# Patient Record
Sex: FEMALE | Race: WHITE | Employment: UNEMPLOYED | URBAN - METROPOLITAN AREA
[De-identification: names, ages, dates, MRNs, and addresses within clinical notes are randomized per-mention and may not be internally consistent; named-entity substitution may affect disease eponyms.]

---

## 2019-08-06 ENCOUNTER — OFFICE VISIT (OUTPATIENT)
Dept: FAMILY MEDICINE CLINIC | Age: 29
End: 2019-08-06

## 2019-08-06 VITALS
TEMPERATURE: 98.1 F | HEIGHT: 69 IN | HEART RATE: 128 BPM | DIASTOLIC BLOOD PRESSURE: 85 MMHG | SYSTOLIC BLOOD PRESSURE: 120 MMHG | BODY MASS INDEX: 18.66 KG/M2 | WEIGHT: 126 LBS | RESPIRATION RATE: 12 BRPM | OXYGEN SATURATION: 99 %

## 2019-08-06 DIAGNOSIS — E10.9 CONTROLLED DIABETES MELLITUS TYPE 1 WITHOUT COMPLICATIONS (HCC): Primary | ICD-10-CM

## 2019-08-06 RX ORDER — INSULIN GLARGINE 100 [IU]/ML
16 INJECTION, SOLUTION SUBCUTANEOUS DAILY
COMMUNITY
End: 2019-10-04 | Stop reason: SDUPTHER

## 2019-08-06 RX ORDER — INSULIN LISPRO 100 [IU]/ML
INJECTION, SOLUTION INTRAVENOUS; SUBCUTANEOUS
COMMUNITY
End: 2019-10-08 | Stop reason: SDUPTHER

## 2019-08-06 NOTE — PROGRESS NOTES
Taras Chavarria    CC: EOC    HPI:     DMI:  -Diagnosed on December 29, 2018  -Was associated with weight loss  -States HGBA1c was 13% and BS was in 500's at the time   -Was started on insulin which has had her blood sugar well controlled  -Currently taking 16 units of Lantus in AM and SSI (Humalog 3 units TID)  -Does not check FBS  -Reports last hemoglobin A1c was done in April or May and was 6.2%  -Was being managed by Dr. Kelly Em with [facility]      ROS: Positive items marked in RED  CON: fever, chills  Cardiovascular: palpitations, CP  Resp: SOB, cough  GI: nausea, vomiting, diarrhea  : dysuria, hematuria      Past Medical History:   Diagnosis Date    Cervical high risk HPV (human papillomavirus) test positive 07/31/2012    Diabetes ([facility] [address] 2018    Type 1       Past Surgical History:   Procedure Laterality Date    HX WISDOM TEETH EXTRACTION         Family History   Problem Relation Age of Onset    Thyroid Disease Mother         hypo    Diabetes Father         DMII    Hypertension Father     Heart Disease Father        Social History     Socioeconomic History    Marital status:      Spouse name: Not on file    Number of children: Not on file    Years of education: Not on file    Highest education level: Not on file   Tobacco Use    Smoking status: Never Smoker    Smokeless tobacco: Never Used   Substance and Sexual Activity    Alcohol use: Yes     Comment: social    Drug use: Never    Sexual activity: Yes     Partners: Male     Birth control/protection: Surgical       No Known Allergies      Current Outpatient Medications:     insulin glargine (LANTUS U-100 INSULIN) 100 unit/mL injection, 16 Units by SubCUTAneous route daily. , Disp: , Rfl:     insulin lispro (HUMALOG U-100 INSULIN) 100 unit/mL injection, by SubCUTAneous route.  Three times daily before meals sliding scale, Disp: , Rfl:     Physical Exam:      /85   Pulse (!) 128   Temp 98.1 °F (36.7 °C) (Oral)   Resp 12   Ht 5' 9\" (1.753 m)   Wt 126 lb (57.2 kg)   LMP 07/06/2019 (Approximate)   SpO2 99%   BMI 18.61 kg/m²     General:  WD, WN, NAD, conversant  Eyes: sclera clear bilaterally, no discharge noted, eyelids normal in appearance  HENT: NCAT  Lungs: CTAB, normal respiratory effort and rate  CV: RRR, no MRGs  ABD: soft, non-tender, non-distended, normal bowel sounds  Ext: no peripheral edema or digital cyanosis noted  Skin: normal temperature, turgor, color, and texture  Psych: alert and oriented to person, place and situation, normal affect  Neuro: speech normal, moving all extremities, gait normal      Assessment/Plan     DMI, Well Controlled:  -Will continue current insulin regimen  -HGBA1c, CBC, CMP, and fasting lipid panel ordered  -Need to obtain records from prior PCP for review  -Referred to endocrinology  -Follow-up in 1 month for well woman visit        Roddy Judd MD  8/6/2019, 3:36 PM

## 2019-08-06 NOTE — PROGRESS NOTES
1. Have you been to the ER, urgent care clinic since your last visit? Hospitalized since your last visit? No    2. Have you seen or consulted any other health care providers outside of the [address] [place] [address] since your last visit? Include any pap smears or colon screening.  No

## 2019-10-04 RX ORDER — INSULIN GLARGINE 100 [IU]/ML
16 INJECTION, SOLUTION SUBCUTANEOUS DAILY
Qty: 1 VIAL | Refills: 1 | Status: SHIPPED | OUTPATIENT
Start: 2019-10-04 | End: 2019-10-11 | Stop reason: SDUPTHER

## 2019-10-11 RX ORDER — INSULIN LISPRO 100 [IU]/ML
INJECTION, SOLUTION INTRAVENOUS; SUBCUTANEOUS
Qty: 3 VIAL | Refills: 1
Start: 2019-10-11

## 2019-10-11 RX ORDER — INSULIN GLARGINE 100 [IU]/ML
16 INJECTION, SOLUTION SUBCUTANEOUS DAILY
Qty: 3 VIAL | Refills: 1 | Status: SHIPPED | OUTPATIENT
Start: 2019-10-11

## 2019-10-11 NOTE — TELEPHONE ENCOUNTER
[facility]ication [facility] on 10/4/2019 needs to be 90 day supply. Dispense amount changed to 90 day supply.

## 2020-07-17 ENCOUNTER — APPOINTMENT (OUTPATIENT)
Dept: GENERAL RADIOLOGY | Age: 30
End: 2020-07-17
Attending: EMERGENCY MEDICINE
Payer: OTHER GOVERNMENT

## 2020-07-17 ENCOUNTER — HOSPITAL ENCOUNTER (EMERGENCY)
Age: 30
Discharge: HOME | End: 2020-07-17
Attending: EMERGENCY MEDICINE
Payer: OTHER GOVERNMENT

## 2020-07-17 VITALS
SYSTOLIC BLOOD PRESSURE: 131 MMHG | HEART RATE: 97 BPM | RESPIRATION RATE: 15 BRPM | TEMPERATURE: 99.1 F | OXYGEN SATURATION: 99 % | DIASTOLIC BLOOD PRESSURE: 82 MMHG

## 2020-07-17 DIAGNOSIS — Z20.822 SUSPECTED COVID-19 VIRUS INFECTION: Primary | ICD-10-CM

## 2020-07-17 DIAGNOSIS — R73.9 HYPERGLYCEMIA: ICD-10-CM

## 2020-07-17 LAB — GLUCOSE BLD STRIP.AUTO-MCNC: 204 MG/DL (ref 70–110)

## 2020-07-17 PROCEDURE — 71045 X-RAY EXAM CHEST 1 VIEW: CPT

## 2020-07-17 PROCEDURE — 87635 SARS-COV-2 COVID-19 AMP PRB: CPT

## 2020-07-17 PROCEDURE — 82962 GLUCOSE BLOOD TEST: CPT

## 2020-07-17 PROCEDURE — 99282 EMERGENCY DEPT VISIT SF MDM: CPT

## 2020-07-17 NOTE — LETTER
[facility] EMERGENCY DEPT 
[address] 
[address] [facility] [phone] Work/School Note Date: 7/17/2020 To Whom It May concern: 
 
Fe Guadalupe was seen and treated today in the emergency room by the following provider(s): 
Attending Provider: Graham Torres MD.   
 
Fe Guadalupe may return to work on 7/31/220. Sincerely, Mey Fuller MD

## 2020-07-17 NOTE — ED PROVIDER NOTES
EMERGENCY DEPARTMENT HISTORY AND PHYSICAL EXAM    3:27 PM      Date: 7/17/2020  Patient Name: Juan Pablo Malik    History of Presenting Illness     Chief Complaint   Patient presents with    Chest Congestion         History Provided By: Patient      Additional History (Context): Juan Pablo Malik is a 30 y.o. female who presents with concern for possible COVID. Patient is an insulin-dependent diabetic who for the past 4 days  Has had increased chest tightness cough malaise subjective fevers. She is followed with an endocrinologist who referred her to the emergency department for evaluation of possible COVID. Denies any nausea vomiting she is complaining of mild cough chest tightness and shortness of breath. Social history is negative tobacco alcohol or recreational drug use  PCP: Ashley Perry MD      Current Outpatient Medications   Medication Sig Dispense Refill    insulin glargine (LANTUS U-100 INSULIN) 100 unit/mL injection 16 Units by SubCUTAneous route daily.  3 Vial 1    insulin lispro (HUMALOG U-100 INSULIN) 100 unit/mL injection Three times daily before meals sliding scale 3 Vial 1       Past History     Past Medical History:  Past Medical History:   Diagnosis Date    Cervical high risk HPV (human papillomavirus) test positive 07/31/2012    Diabetes ([facility] [address] 2018    Type 1       Past Surgical History:  Past Surgical History:   Procedure Laterality Date    HX WISDOM TEETH EXTRACTION         Family History:  Family History   Problem Relation Age of Onset    Thyroid Disease Mother         hypo    Diabetes Father         DMII    Hypertension Father     Heart Disease Father        Social History:  Social History     Tobacco Use    Smoking status: Never Smoker    Smokeless tobacco: Never Used   Substance Use Topics    Alcohol use: Yes     Comment: social    Drug use: Never       Allergies:  No Known Allergies      Review of Systems       Review of Systems   Constitutional: Positive for appetite change, fatigue and fever. Negative for chills and diaphoresis. HENT: Negative for congestion. Eyes: Negative for visual disturbance. Respiratory: Positive for cough, chest tightness and shortness of breath. Cardiovascular: Negative for chest pain. Gastrointestinal: Negative for abdominal pain, diarrhea, nausea and vomiting. Musculoskeletal: Negative for back pain. Skin: Negative for rash. Neurological: Negative for dizziness, syncope and weakness. All other systems reviewed and are negative. Physical Exam     Visit Vitals  /82 (BP 1 Location: Left arm, BP Patient Position: At rest)   Pulse 97   Temp 99.1 °F (37.3 °C)   Resp 15   SpO2 99%       Physical Exam  Vitals signs and nursing note reviewed. Constitutional:       General: She is not in acute distress. Appearance: She is well-developed. HENT:      Head: Normocephalic and atraumatic. Eyes:      General: No scleral icterus. Conjunctiva/sclera: Conjunctivae normal.      Pupils: Pupils are equal, round, and reactive to light. Neck:      Musculoskeletal: Normal range of motion and neck supple. Cardiovascular:      Rate and Rhythm: Normal rate and regular rhythm. Heart sounds: Normal heart sounds. No murmur. Pulmonary:      Effort: Pulmonary effort is normal. No respiratory distress. Breath sounds: Normal breath sounds. Abdominal:      General: Bowel sounds are normal. There is no distension. Palpations: Abdomen is soft. Tenderness: There is no abdominal tenderness. Lymphadenopathy:      Cervical: No cervical adenopathy. Skin:     General: Skin is warm and dry. Findings: No rash. Neurological:      Mental Status: She is alert and oriented to person, place, and time.       Coordination: Coordination normal.   Psychiatric:         Behavior: Behavior normal.           Diagnostic Study Results     Labs -  Recent Results (from the past 12 hour(s))   GLUCOSE, POC    Collection Time: 07/17/20 3:16 PM   Result Value Ref Range    Glucose (POC) 204 (H) 70 - 110 mg/dL       Radiologic Studies -   XR CHEST PORT   Final Result   IMPRESSION:      No acute findings in the chest.            Medical Decision Making   I am the first provider for this patient. I reviewed the vital signs, available nursing notes, past medical history, past surgical history, family history and social history. Vital Signs-Reviewed the patient's vital signs. EKG:    Records Reviewed: Nursing Notes and Old Medical Records (Time of Review: 3:27 PM)    ED Course: Progress Notes, Reevaluation, and Consults:      Provider Notes (Medical Decision Making):   MDM  Number of Diagnoses or Management Options  Hyperglycemia:   Suspected COVID-19 virus infection:   Diagnosis management comments: Nontoxic-appearing check is 200 patient states she just recently ate lunch will check for COVID chest x-ray pregnancy    Cxr neg hcg neg will dc home        Amount and/or Complexity of Data Reviewed  Clinical lab tests: ordered and reviewed  Tests in the radiology section of CPT®: ordered and reviewed    Risk of Complications, Morbidity, and/or Mortality  Presenting problems: high  Diagnostic procedures: moderate  Management options: moderate            Critical Care Time:       Diagnosis     Clinical Impression:   1. Suspected COVID-19 virus infection    2.  Hyperglycemia        Disposition: home     Follow-up Information     Follow up With Specialties Details Why [address] [facility] EMERGENCY DEPT Emergency Medicine  As needed, If symptoms worsen [address] [place] [address] 51    Blade Lucas MD Family Practice Schedule an appointment as soon as possible for a visit For ED follow-up [address]  [phone]             Patient's Medications   Start Taking    No medications on file   Continue Taking    INSULIN GLARGINE (LANTUS U-100 INSULIN) 100 UNIT/ML INJECTION    16 Units by SubCUTAneous route daily. INSULIN LISPRO (HUMALOG U-100 INSULIN) 100 UNIT/ML INJECTION    Three times daily before meals sliding scale   These Medications have changed    No medications on file   Stop Taking    No medications on file     _______________________________    Please note that this dictation was completed with [employer], the computer voice recognition software. Quite often unanticipated grammatical, syntax, homophones, and other interpretive errors are inadvertently transcribed by the computer software. Please disregard these errors. Please excuse any errors that have escaped final proofreading.

## 2020-07-17 NOTE — ED TRIAGE NOTES
Pt presents for chest pain, fatigue.  DM and was told endocrinologist to come to ER for COVID testing

## 2020-07-19 LAB — SARS-COV-2, COV2NT: NOT DETECTED

## 2020-07-28 ENCOUNTER — VIRTUAL VISIT (OUTPATIENT)
Dept: FAMILY MEDICINE CLINIC | Age: 30
End: 2020-07-28

## 2023-05-21 RX ORDER — INSULIN LISPRO 100 [IU]/ML
INJECTION, SOLUTION INTRAVENOUS; SUBCUTANEOUS
COMMUNITY
Start: 2019-10-11

## 2023-05-21 RX ORDER — INSULIN GLARGINE 100 [IU]/ML
16 INJECTION, SOLUTION SUBCUTANEOUS DAILY
COMMUNITY
Start: 2019-10-11